# Patient Record
Sex: FEMALE | Race: OTHER | URBAN - METROPOLITAN AREA
[De-identification: names, ages, dates, MRNs, and addresses within clinical notes are randomized per-mention and may not be internally consistent; named-entity substitution may affect disease eponyms.]

---

## 2018-03-23 ENCOUNTER — EMERGENCY (EMERGENCY)
Facility: HOSPITAL | Age: 38
LOS: 1 days | Discharge: HOME | End: 2018-03-23
Admitting: EMERGENCY MEDICINE
Payer: COMMERCIAL

## 2018-03-23 VITALS
RESPIRATION RATE: 18 BRPM | OXYGEN SATURATION: 98 % | TEMPERATURE: 98 F | SYSTOLIC BLOOD PRESSURE: 125 MMHG | HEART RATE: 94 BPM | DIASTOLIC BLOOD PRESSURE: 86 MMHG

## 2018-03-23 VITALS
HEIGHT: 67 IN | RESPIRATION RATE: 18 BRPM | WEIGHT: 145.95 LBS | OXYGEN SATURATION: 97 % | SYSTOLIC BLOOD PRESSURE: 107 MMHG | TEMPERATURE: 98 F | HEART RATE: 77 BPM | DIASTOLIC BLOOD PRESSURE: 59 MMHG

## 2018-03-23 DIAGNOSIS — Z79.899 OTHER LONG TERM (CURRENT) DRUG THERAPY: ICD-10-CM
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
## 2018-03-23 DIAGNOSIS — S61.512A LACERATION WITHOUT FOREIGN BODY OF LEFT WRIST, INITIAL ENCOUNTER: ICD-10-CM
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
## 2018-03-23 PROCEDURE — 12042 INTMD RPR N-HF/GENIT2.6-7.5: CPT

## 2018-03-23 PROCEDURE — 73110 X-RAY EXAM OF WRIST: CPT

## 2018-03-23 PROCEDURE — 99285 EMERGENCY DEPT VISIT HI MDM: CPT | Mod: 25

## 2018-03-23 PROCEDURE — 99283 EMERGENCY DEPT VISIT LOW MDM: CPT

## 2018-03-23 PROCEDURE — 73110 X-RAY EXAM OF WRIST: CPT | Mod: 26,LT

## 2018-03-23 RX ORDER — VEDOLIZUMAB 108 MG/.68ML
0 INJECTION, SOLUTION SUBCUTANEOUS
Qty: 0 | Refills: 0 | COMMUNITY

## 2018-03-23 NOTE — ED ADULT NURSE NOTE - OBJECTIVE STATEMENT
PT came to ED complaining of lacerations. Pt states she was washing glass cup that broke. Pt currently has 2 1cm linear lacerations to left medial wrist.  Bleeding control by PT, minimal bleeding at this time.  PT denies all other medical complaints at this time.  Reports burning pain.

## 2018-03-23 NOTE — ED PROVIDER NOTE - PROGRESS NOTE DETAILS
wound repaired by Dr Shay; pt going to [place] next week, so she will follow up with Dr Shay's colleague Sim Arroyo MD in [place] for suture removal next Friday.

## 2018-03-23 NOTE — ED PROVIDER NOTE - NS ED ROS FT
CONST:  no fever/chills  NEURO: no headache or dizziness, no weakness  CARDIO: no chest pain  PULM: no dyspnea   GI: no abdominal pain, nausea or vomiting CONST:  no fever/chills  NEURO: no focal tingling or numbness  CARDIO: no chest pain  PULM: no dyspnea   GI: no abdominal pain, nausea or vomiting

## 2018-03-23 NOTE — ED PROVIDER NOTE - MEDICAL DECISION MAKING DETAILS
Left distal forearm/wrist laceration.  NVI.  No active bleeding. Tetanus UTD.  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 Dr Shay will come to ED.  XR neg for foreign body.

## 2018-03-23 NOTE — ED PROVIDER NOTE - PHYSICAL EXAMINATION
GEN: awake, alert, NAD  EYES: Clear, Pupils equal and round.  PULM: Lungs clear  CV: RRR S1S2  GI: Abd soft, nontender  MSK: PATEL without difficulty;  2 cm z-shaped lac to ulnar aspect left forearm, just proximal to wrist.  No active bleeding.    SKIN: normal color and turgor, no rash  NEURO: Alert, oriented. PATEL normally.  Speech clear.  Gait steady.  Motor fxn, 2-point, light touch, and pinprick intact in MRU distributions left hand.

## 2018-03-23 NOTE — ED PROVIDER NOTE - OBJECTIVE STATEMENT
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  Was washing dishes at home apx 2 hrs earlier when a glass tumbler broke, cutting her left wrist.  She thinks there may be glass in the wound. Last tetanus apx 3 yrs ago.  She started taking Entyvio for UC today. No fever/chills, no abdominal pain.

## 2018-03-23 NOTE — ED ADULT TRIAGE NOTE - ARRIVAL INFO ADDITIONAL COMMENTS
lac to left wrist. states glass shattered an hour ago. two lacs noted. no active bleeding noted. denies any numbness/tingling. +3 radial pulse and <2 sec cap refill noted. unknown of last tetanus vaccine

## 2019-08-20 NOTE — ED ADULT NURSE NOTE - FINAL NURSING ELECTRONIC SIGNATURE
Assumed care of pt from [facility]. She is sleeping but easily aroused by voice. 23-Mar-2018 16:31

## 2019-09-16 RX ORDER — DIPHENHYDRAMINE HYDROCHLORIDE 50 MG/ML
50 INJECTION, SOLUTION INTRAMUSCULAR; INTRAVENOUS
Status: ACTIVE | OUTPATIENT
Start: 2019-09-23 | End: 2019-09-23

## 2019-09-16 RX ORDER — ACETAMINOPHEN 325 MG/1
650 TABLET ORAL
Status: ACTIVE | OUTPATIENT
Start: 2019-09-23 | End: 2019-09-23

## 2019-09-23 ENCOUNTER — HOSPITAL ENCOUNTER (OUTPATIENT)
Dept: INFUSION THERAPY | Age: 39
Discharge: HOME | End: 2019-09-23
Payer: COMMERCIAL

## 2019-09-23 VITALS
DIASTOLIC BLOOD PRESSURE: 63 MMHG | OXYGEN SATURATION: 99 % | HEART RATE: 61 BPM | RESPIRATION RATE: 18 BRPM | TEMPERATURE: 98.2 F | SYSTOLIC BLOOD PRESSURE: 96 MMHG

## 2019-09-23 PROCEDURE — 96413 CHEMO IV INFUSION 1 HR: CPT

## 2019-09-23 PROCEDURE — 74011250636 HC RX REV CODE- 250/636

## 2019-09-23 RX ADMIN — VEDOLIZUMAB 300 MG: 300 INJECTION, POWDER, LYOPHILIZED, FOR SOLUTION INTRAVENOUS at 08:56

## 2019-09-23 NOTE — PROGRESS NOTES
0755 Pt arrived at [facility] ambulatory and in no acute distress for Entyvio. Assessment complete, pt denies any questions or concerns. Pt reports she has received Entyvio numerous times in the past. 24 gauge IV started in [facility] without difficulty, brisk blood return. Patient Vitals for the past 12 hrs:   Temp Pulse Resp BP SpO2   09/23/19 0936  61 18 96/63    09/23/19 0759 98.2 °F (36.8 °C) 78 18 91/55 99 %       Medications Administered     vedolizumab (ENTYVIO) 300 mg in 0.9% sodium chloride 250 mL, overfill volume 25 mL infusion     Admin Date  09/23/2019 Action  Given Dose  300 mg Rate  560 mL/hr Route  IntraVENous Administered By  Helena Olivares, RN              5161 Pt tolerated infusion well, no adverse reaction noted. D/Cd from [facility] ambulatory and in no acute distress. No future appointments, pt to follow-up with referring provider for future appointments.  (Pt is traveling across the [address] [place] [address],[address] with her work.)

## 2023-05-19 NOTE — ED ADULT NURSE REASSESSMENT NOTE - TEMPLATE LIST FOR HEAD TO TOE ASSESSMENT
Last visit with ALCON Wallis: 1/11/2023  Last visit in [facility] INFECTIOUS DISEASE: 1/11/2023    Patient's next visit in [facility] INFECTIOUS DISEASE: 5/30/2023    Wounds